# Patient Record
Sex: MALE | Race: WHITE | Employment: FULL TIME | URBAN - METROPOLITAN AREA
[De-identification: names, ages, dates, MRNs, and addresses within clinical notes are randomized per-mention and may not be internally consistent; named-entity substitution may affect disease eponyms.]

---

## 2024-04-28 ENCOUNTER — APPOINTMENT (OUTPATIENT)
Dept: CT IMAGING | Age: 44
End: 2024-04-28
Payer: COMMERCIAL

## 2024-04-28 ENCOUNTER — HOSPITAL ENCOUNTER (EMERGENCY)
Age: 44
Discharge: HOME | End: 2024-04-28
Attending: EMERGENCY MEDICINE
Payer: COMMERCIAL

## 2024-04-28 ENCOUNTER — APPOINTMENT (OUTPATIENT)
Dept: GENERAL RADIOLOGY | Age: 44
End: 2024-04-28
Payer: COMMERCIAL

## 2024-04-28 VITALS
RESPIRATION RATE: 15 BRPM | HEIGHT: 66 IN | TEMPERATURE: 98.6 F | SYSTOLIC BLOOD PRESSURE: 101 MMHG | WEIGHT: 220 LBS | BODY MASS INDEX: 35.36 KG/M2 | DIASTOLIC BLOOD PRESSURE: 81 MMHG | HEART RATE: 74 BPM | OXYGEN SATURATION: 96 %

## 2024-04-28 DIAGNOSIS — R55 SYNCOPE AND COLLAPSE: Primary | ICD-10-CM

## 2024-04-28 LAB
ABO + RH BLD: NORMAL
ANION GAP SERPL CALCULATED.3IONS-SCNC: 15 MMOL/L (ref 9–16)
ARM BAND NUMBER: NORMAL
BLOOD BANK SAMPLE EXPIRATION: NORMAL
BLOOD BANK SPECIMEN: ABNORMAL
BLOOD GROUP ANTIBODIES SERPL: NEGATIVE
BODY TEMPERATURE: 37
BUN SERPL-MCNC: 22 MG/DL (ref 6–20)
CHLORIDE SERPL-SCNC: 104 MMOL/L (ref 98–107)
CK SERPL-CCNC: 388 U/L (ref 39–308)
CO2 SERPL-SCNC: 21 MMOL/L (ref 20–31)
COHGB MFR BLD: 0.4 % (ref 0–5)
CREAT SERPL-MCNC: 1.4 MG/DL (ref 0.7–1.2)
ERYTHROCYTE [DISTWIDTH] IN BLOOD BY AUTOMATED COUNT: 12.2 % (ref 11.8–14.4)
ETHANOL PERCENT: <0.01 %
ETHANOLAMINE SERPL-MCNC: <10 MG/DL
FIO2 ON VENT: ABNORMAL %
GFR SERPL CREATININE-BSD FRML MDRD: 65 ML/MIN/1.73M2
GLUCOSE SERPL-MCNC: 134 MG/DL (ref 74–99)
HCO3 VENOUS: 26.2 MMOL/L (ref 24–30)
HCT VFR BLD AUTO: 47.5 % (ref 40.7–50.3)
HGB BLD-MCNC: 16.2 G/DL (ref 13–17)
INR PPP: 1.1
MCH RBC QN AUTO: 29.9 PG (ref 25.2–33.5)
MCHC RBC AUTO-ENTMCNC: 34.1 G/DL (ref 28.4–34.8)
MCV RBC AUTO: 87.8 FL (ref 82.6–102.9)
MYOGLOBIN SERPL-MCNC: 385 NG/ML (ref 28–72)
NRBC BLD-RTO: 0 PER 100 WBC
O2 SAT, VEN: 42.8 % (ref 60–85)
PARTIAL THROMBOPLASTIN TIME: 22.8 SEC (ref 23–36.5)
PCO2, VEN: 46.7 MM HG (ref 39–55)
PH VENOUS: 7.37 (ref 7.32–7.42)
PLATELET # BLD AUTO: 221 K/UL (ref 138–453)
PMV BLD AUTO: 10.8 FL (ref 8.1–13.5)
PO2, VEN: 25.5 MM HG (ref 30–50)
POSITIVE BASE EXCESS, VEN: 0.7 MMOL/L (ref 0–2)
POTASSIUM SERPL-SCNC: 4.7 MMOL/L (ref 3.7–5.3)
PROTHROMBIN TIME: 14 SEC (ref 11.7–14.9)
RBC # BLD AUTO: 5.41 M/UL (ref 4.21–5.77)
SODIUM SERPL-SCNC: 140 MMOL/L (ref 136–145)
TROPONIN I SERPL HS-MCNC: 19 NG/L (ref 0–22)
TROPONIN I SERPL HS-MCNC: 25 NG/L (ref 0–22)
WBC OTHER # BLD: 13.6 K/UL (ref 3.5–11.3)

## 2024-04-28 PROCEDURE — 82805 BLOOD GASES W/O2 SATURATION: CPT

## 2024-04-28 PROCEDURE — G0480 DRUG TEST DEF 1-7 CLASSES: HCPCS

## 2024-04-28 PROCEDURE — 86850 RBC ANTIBODY SCREEN: CPT

## 2024-04-28 PROCEDURE — 82947 ASSAY GLUCOSE BLOOD QUANT: CPT

## 2024-04-28 PROCEDURE — 72170 X-RAY EXAM OF PELVIS: CPT

## 2024-04-28 PROCEDURE — 93005 ELECTROCARDIOGRAM TRACING: CPT | Performed by: STUDENT IN AN ORGANIZED HEALTH CARE EDUCATION/TRAINING PROGRAM

## 2024-04-28 PROCEDURE — 85730 THROMBOPLASTIN TIME PARTIAL: CPT

## 2024-04-28 PROCEDURE — 84484 ASSAY OF TROPONIN QUANT: CPT

## 2024-04-28 PROCEDURE — 86900 BLOOD TYPING SEROLOGIC ABO: CPT

## 2024-04-28 PROCEDURE — 2580000003 HC RX 258: Performed by: STUDENT IN AN ORGANIZED HEALTH CARE EDUCATION/TRAINING PROGRAM

## 2024-04-28 PROCEDURE — 6360000002 HC RX W HCPCS: Performed by: STUDENT IN AN ORGANIZED HEALTH CARE EDUCATION/TRAINING PROGRAM

## 2024-04-28 PROCEDURE — 82565 ASSAY OF CREATININE: CPT

## 2024-04-28 PROCEDURE — 12001 RPR S/N/AX/GEN/TRNK 2.5CM/<: CPT

## 2024-04-28 PROCEDURE — 85027 COMPLETE CBC AUTOMATED: CPT

## 2024-04-28 PROCEDURE — 86901 BLOOD TYPING SEROLOGIC RH(D): CPT

## 2024-04-28 PROCEDURE — 83874 ASSAY OF MYOGLOBIN: CPT

## 2024-04-28 PROCEDURE — 82550 ASSAY OF CK (CPK): CPT

## 2024-04-28 PROCEDURE — 71045 X-RAY EXAM CHEST 1 VIEW: CPT

## 2024-04-28 PROCEDURE — 70450 CT HEAD/BRAIN W/O DYE: CPT

## 2024-04-28 PROCEDURE — 84520 ASSAY OF UREA NITROGEN: CPT

## 2024-04-28 PROCEDURE — 96374 THER/PROPH/DIAG INJ IV PUSH: CPT

## 2024-04-28 PROCEDURE — 72125 CT NECK SPINE W/O DYE: CPT

## 2024-04-28 PROCEDURE — 99284 EMERGENCY DEPT VISIT MOD MDM: CPT | Performed by: SURGERY

## 2024-04-28 PROCEDURE — 84703 CHORIONIC GONADOTROPIN ASSAY: CPT

## 2024-04-28 PROCEDURE — 6810039001 HC L1 TRAUMA PRIORITY

## 2024-04-28 PROCEDURE — 99285 EMERGENCY DEPT VISIT HI MDM: CPT

## 2024-04-28 PROCEDURE — 85610 PROTHROMBIN TIME: CPT

## 2024-04-28 PROCEDURE — 80051 ELECTROLYTE PANEL: CPT

## 2024-04-28 RX ORDER — ONDANSETRON 2 MG/ML
4 INJECTION INTRAMUSCULAR; INTRAVENOUS ONCE
Status: COMPLETED | OUTPATIENT
Start: 2024-04-28 | End: 2024-04-28

## 2024-04-28 RX ORDER — 0.9 % SODIUM CHLORIDE 0.9 %
1000 INTRAVENOUS SOLUTION INTRAVENOUS ONCE
Status: COMPLETED | OUTPATIENT
Start: 2024-04-28 | End: 2024-04-28

## 2024-04-28 RX ADMIN — ONDANSETRON 4 MG: 2 INJECTION INTRAMUSCULAR; INTRAVENOUS at 14:21

## 2024-04-28 RX ADMIN — SODIUM CHLORIDE 1000 ML: 9 INJECTION, SOLUTION INTRAVENOUS at 14:30

## 2024-04-28 NOTE — ED PROVIDER NOTES
[facility] ED  Emergency Department  Emergency Medicine Resident Turn-Over     Note Started: 3:49 PM EDT    Care of Di Trauma [place] was assumed from Dr. Monte and is being seen for Trauma  .  The patient's initial evaluation and plan have been discussed with the prior provider who initially evaluated the patient.     EMERGENCY DEPARTMENT COURSE / MEDICAL DECISION MAKING:       MEDICATIONS GIVEN:  Orders Placed This Encounter   Medications    ondansetron (ZOFRAN) injection 4 mg    sodium chloride 0.9 % bolus 1,000 mL       LABS / RADIOLOGY:     Labs Reviewed   TROP/MYOGLOBIN - Abnormal; Notable for the following components:       Result Value    Troponin, High Sensitivity 25 (*)     Myoglobin 385 (*)     All other components within normal limits   TRAUMA PANEL - Abnormal; Notable for the following components:    BUN 22 (*)     WBC 13.6 (*)     Creatinine 1.4 (*)     Glucose 134 (*)     APTT 22.8 (*)     pO2, Mario 25.5 (*)     O2 Sat, Mario 42.8 (*)     All other components within normal limits   CK - Abnormal; Notable for the following components:    Total  (*)     All other components within normal limits   URINE DRUG SCREEN   URINALYSIS   TROPONIN   TYPE AND SCREEN       XR PELVIS (1-2 VIEWS)    Result Date: 4/28/2024  EXAMINATION: ONE XRAY VIEW OF THE PELVIS 4/28/2024 2:32 pm COMPARISON: None. HISTORY: ORDERING SYSTEM PROVIDED HISTORY: fall  TECHNOLOGIST PROVIDED HISTORY: fall  Reason for Exam: supine Syncopal fall after running a marathon. FINDINGS: Both femoral heads are well circumscribed and situated within the acetabula. Mild degenerative changes are present in the included lower lumbar spine and hips.  No acute fracture, dislocation or effusion is noted.     Mild degenerative changes.  No acute fracture or dislocation.  Fecal material obscures the lower sacrum.     XR CHEST PORTABLE    Result Date: 4/28/2024  EXAMINATION: ONE XRAY VIEW OF THE CHEST 4/28/2024 2:32 pm 
   [facility] ED     Emergency Department     Faculty Attestation    I performed a history and physical examination of the patient and discussed management with the resident. I reviewed the resident’s note and agree with the documented findings and plan of care. Any areas of disagreement are noted on the chart. I was personally present for the key portions of any procedures. I have documented in the chart those procedures where I was not present during the key portions. I have reviewed the emergency nurses triage note. I agree with the chief complaint, past medical history, past surgical history, allergies, medications, social and family history as documented unless otherwise noted below. For Physician Assistant/ Nurse Practitioner cases/documentation I have personally evaluated this patient and have completed at least one if not all key elements of the E/M (history, physical exam, and MDM). Additional findings are as noted.    2:06 PM EDT    Patient brought in by EMS as a trauma priority after he had a syncopal fall after running the [place] today.  Patient did complete the marathon.  He says that he was sitting down after the race and then went to stand up and became dizzy and lightheaded and then passed out hitting his head.  Patient was hypotensive for transport which is why he was made the trauma priority.  On arrival here, patient is alert and oriented and answering questions appropriately with a GCS of 15.  He does have a laceration to his occipital scalp.  Airway is intact and breath sounds are equal bilaterally.  Will await imaging and trauma surgery recommendations.  Will obtain an EKG and labs and reassess.    EKG Interpretation    Interpreted by me    Rhythm: normal sinus   Rate: normal  Axis: normal  Ectopy: none  Conduction: normal  ST Segments: no acute change  T Waves: no acute change  Q Waves: none    Clinical Impression: no acute changes and normal EKG      Pallavi Pineda, 
 Vital signs stable.  No longer hypotensive.  Laceration to the occiput.  Trauma surgery to assume care at this time.  Will follow trauma surgery recommendations.    Amount and/or Complexity of Data Reviewed  Labs: ordered. Decision-making details documented in ED Course.  Radiology:  Decision-making details documented in ED Course.    Risk  Prescription drug management.    EMERGENCY DEPARTMENT COURSE:  ED Course as of 05/02/24 1651   Sun Apr 28, 2024   1459 XR PELVIS (1-2 VIEWS) [AR]   1500 XR CHEST PORTABLE [AR]   1500 CT HEAD WO CONTRAST [AR]   1500 CT CERVICAL SPINE WO CONTRAST [AR]   1518 Troponin, High Sensitivity(!): 25  Just ran a marathon.  No chest pain, back pain, shortness of breath, nausea/vomiting.  Will continue to trend.  Receiving fluids. [AR]   1524 43M ran [place], syncopal episode, fell backwards, hit occiput, has lac. Trauma priority. Hypotensive per EMS.  Trop Marginally elevated   Pending second trop   Re-eval  discharge [KK]   1724 Troponin improved will discharge.  [KK]      ED Course User Index  [AR] Earl Monte DO  [KK] Juan Antonio Bloom DO     Awaiting trauma surgery recommendations.  Dr. Bloom to assume care at this time.    FINAL IMPRESSION      No diagnosis found.      DISPOSITION / PLAN     DISPOSITION        PATIENT REFERRED TO:  No follow-up provider specified.    DISCHARGE MEDICATIONS:  New Prescriptions    No medications on file       Earl Monte DO  Emergency Medicine Resident    (Please note that portions of this note were completed with a voice recognition program.  Efforts were made to edit the dictations but occasionally words are mis-transcribed.)

## 2024-04-28 NOTE — DISCHARGE INSTRUCTIONS
You have been seen in the ER today for syncope after running a marathon.   If you begin to experience any symptoms such as chest pain shortness of breath nausea vomiting dizziness drowsiness abdominal pain loss of consciousness or any other symptoms you find concerning please return to the ED for follow-up evaluation.  If you have been given pain medication please take them only as prescribed. Do not take more medication than prescribed at any given time.  Please follow-up with your primary care provider within 3-5 days for continued care, sooner if you have concerns.

## 2024-04-28 NOTE — PROGRESS NOTES
[facility]     Emergency/Trauma Note    PATIENT NAME: Thomas Marsh    Shift date: 04/28/2024  Shift day: Sunday   Shift # 1    Room # 02/02     Name: Thomas Marsh           Age: 43 y.o.  Gender: male          Alevism: [place]   Place of Zoroastrianism: [facility]    Trauma/Incident type: Adult Trauma Priority  Admit Date & Time: 4/28/2024  1:52 PM  TRAUMA NAME: Gina Mata      ADVANCE DIRECTIVES IN CHART?  No    NAME OF DECISION MAKER:     RELATIONSHIP OF DECISION MAKER TO PATIENT:     PATIENT/EVENT DESCRIPTION:  Gina Mata is a 43 y.o. male who arrived via ground ambulance as adult trauma priority. Per report, patient took a fall. Per report, patient participated in the [employer] today, before he fell. Patient to be admitted to 02/02.       SPIRITUAL ASSESSMENT-INTERVENTION-OUTCOME:  Patient said he was raised [place] and a member of [employer]. Patient was receptive to spiritual care. Family was present at the time.  provided ministry of presence, offered support, prayed with patient and family and reassured them that they were in good hands. Patient and family were very grateful and thankful for the spiritual and emotional support they received.      PATIENT BELONGINGS:  This  did not handle patient's belongings.     ANY BELONGINGS OF SIGNIFICANT VALUE NOTED:  Unknown    REGISTRATION STAFF NOTIFIED?  Yes    WHAT IS YOUR SPIRITUAL CARE PLAN FOR THIS PATIENT?:  Follow up visits recommended for ongoing assessment of patient's condition and for more prayers and support.     Electronically signed by Chaplain Martin, on 4/28/2024 at 2:47 PM.  [facility]  [phone]  PADMINI

## 2024-04-28 NOTE — H&P
TRAUMA H&P/CONSULT    PATIENT NAME: Gina Mata  YOB: 1980  MEDICAL RECORD NO. [id number]   DATE: 4/28/2024  PRIMARY CARE PHYSICIAN: No primary care provider on file.  PATIENT EVALUATED AT THE REQUEST OF : Miriam    ACTIVATION   Trauma Priority    There is no problem list on file for this patient.      IMPRESSION AND PLAN:       Diagnosis: Posterior scalp laceration   Plan: Irrigated thoroughly and closed at bedside with seven staples.    Imaging negative, dispo per ED    If intracranial hemorrhage is present, is it a:  [] BIG 1  [] BIG 2  [] BIG 3  If chest wall injury: Rib score___    CONSULT SERVICES       HISTORY:     Chief Complaint:  \"Back of my head hurts\"    GENERAL DATA  Patient information was obtained from patient.  History/Exam limitations: none.  Injury Date: 4/28/24   Approximate Injury Time: 1300        Transport mode: Ambulance  Referring Hospital:     SETTING OF TRAUMATIC EVENT   Location : Street  Specific Details of Location: Other: Parking lot    MECHANISM OF INJURY    Fall From standing      HISTORY:     Gina Mata is a male that presented to the Emergency Department following a fall after standing up. Patient reports that he ran his first marathon today.  Patient states he completed the race and had a beer.  Patient reports that he was sitting down after the race and went to stand up and became lightheaded and passed out falling backwards hitting the back of his head.    Traumatic loss of Consciousness: No    Total Fluids Given Prior To Arrival  mL    MEDICATIONS:   []  None     []  Information not available due to exam limitations documented above    Prior to Admission medications    Not on File       ALLERGIES:   []  None    []   Information not available due to exam limitations documented above     Patient has no known allergies.    PAST MEDICAL/SURGICAL HISTORY: []  None   []   Information not available due to exam limitations documented above      has

## 2024-04-28 NOTE — ED TRIAGE NOTES
Pt presents to ED Trauma bay A with c/o a fall. Pt was involved in running the [place] today. Pt completed the race and then had his free beer provided by the race. After the beer the patient went to stand and fell on the ground. Pt has visible lacerations to the posterior portion of the head. No obvious sign of deformities.   Pt resting in bed with personal items and call light within reach. Pt family at bedside. Resp even and non labored. No complaints at this time.

## 2024-04-28 NOTE — PROGRESS NOTES
C- Spine Evaluation for Spine Clearance:    Pt is a 43 y.o. male who presented to the ED after syncopal episode after running a marathon striking the occipital scalp and sustaining a 2 cm laceration.    C-Spine precautions of C-collar with spinal neutrality maintained since arrival with current exam directed at further evaluation of spine for clearance purposes.    Pt chart and current images reviewed.  CT C-Spine negative for acute fracture, subluxation, or traumatic injury.  Patient does not have a distracting injury, is not acutely intoxicated and is alert, oriented and fully able to participate in exam.      Pt denies c-spine pain while resting in c-collar.  C-collar removed w/ c-spine neutrality maintained.  Pt denies midline pain with palpation of spinous processes and axial loading.  Pt demonstrated full flexion, extension, and SB ROM without complaints of pain.     Pt denies midline pain with palpation of spinous processes.      C-spine is considered cleared w/out need for further imaging, evaluation, or continuation of c-collar.  TLS considered clear w/out need for further imagine, evaluation    Electronically signed by Zachariah Dumont DO on 4/28/2024 at 3:57 PM

## 2024-05-01 LAB
EKG ATRIAL RATE: 66 BPM
EKG P AXIS: 35 DEGREES
EKG P-R INTERVAL: 182 MS
EKG Q-T INTERVAL: 382 MS
EKG QRS DURATION: 100 MS
EKG QTC CALCULATION (BAZETT): 400 MS
EKG R AXIS: 37 DEGREES
EKG T AXIS: 12 DEGREES
EKG VENTRICULAR RATE: 66 BPM

## 2024-05-01 PROCEDURE — 93010 ELECTROCARDIOGRAM REPORT: CPT | Performed by: INTERNAL MEDICINE
